# Patient Record
(demographics unavailable — no encounter records)

---

## 2017-08-18 NOTE — PSY
Date/Time of Note


Date/Time of Note


DATE: 8/18/17 


TIME: 05:17





Psychiatric Subjective Eval


Consent


Pt consented to telemedicine:  Yes





Subjective Evaluation


Patient location:  emergency


Chief Complaint:  suicidal attempt- lock himself in the bathromm cut his right 

thigh w/ razor


Reason for consult:  SI


Hospitalization:  no


Medical history


 Problems


Medical Problems:


(1) Visit for wound check


Status: Acute  





(2) Wound infection


Status: Acute  








Allergies:  


Coded Allergies:  


     No Known Allergy (Unverified , 9/13/14)





Social History


Marital status:  other





Assessment


Additional comments:


IDENTIFYING INFORMATION:  19 year old  Male patient who is currently 

located at the hospital and for whom psychiatric consultation was requested. 





SOURCES OF INFORMATION: The patient who appears to be reliable and the medical 

records; the nursing staff.





CHIEF COMPLAINT: "I just did not want to live anymore".





HISTORY OF PRESENT ILLNESS:


The patient was interviewed via telemedicine in the presence of and under the 

supervision of nursing staff of the hospital. The consent to conducting this 

interview via telemedicine was obtained by the nursing staff at the hospital. 





AIMEE Nation reports that the patient presents with anxiety, SI, and superficial 

laceration to his left thigh in the context of medication noncompliance.





The patient reports that he felt like he did not want to be alive anymore, and 

cut himself on his right hip with a razor to distract himself from the mental 

pain. 


Admits to thinking that he would be better off not being alive since Tuesday. 


Admits to having a depressed mood, anhedonia, fluctuating insomnia, fatigue, 

low motivation, low appetite.


Admits to having thought of suicide before but not today.


Denies having AH, VH, delusions.





The patient denies using alcohol heavily or regularly. 


The patient denies using any other substances.





In terms of past psychiatric history, the patient reports having a history of 

no past psychiatric hospitalizations. Has prior psychiatric contacts for 

depression. The patient reports having a history of no past suicide attempts. 

Has h/o cutting himself in the past.





PAST MEDICAL HISTORY: 


none.





CURRENT MEDICATIONS:


fluoxetine 40 mg po qday (started taking this again 5 days ago).





ALLERGIES TO MEDICATIONS: 


NKDA.





SOCIAL HISTORY: 


lives with parents, single, no children;


employed as a ; 


no firearms at home.





LABORATORY TESTS: 


pending.





REVIEW OF SYSTEMS:


Constitutional (e.g., fever, weight loss): negative;   


Eyes, Ears, Nose, Mouth, Throat: negative;


Cardiovascular: negative;


Respiratory: negative;


Gastrointestinal: negative;


Genitourinary: negative;


Musculoskeletal: negative;


Integumentary (skin and/or breast): negative; 


Neurological: negative;


Psychiatric: as per HPI;


Endocrine: negative;


Hematologic/Lymphatic: negative; 


Allergic/Immunologic: negative.





MENTAL STATUS EXAMINATION: 


General Appearance and Behavior: Calm, cooperative with the interview, pleasant 

with the current interviewer, makes poor eye contact, poorly groomed, no 

abnormal movements noted.


Speech: Slow rate, regular rhythm, increased latency, low volume, somewhat 

decreased in amount.


Flow of thought: sequential, logical, goal-directed.


Content of thought: no auditory hallucinations, no visual hallucinations, no 

delusions, 


positive for suicidal ideation; no homicidal ideation. 


Mood: "depressed".


Affect: dysthymic, dysphoric, not reactive.


Attention: normal based on the interview.


Insight: fair.


Judgment: poor.


Memory: normal based on the interview.


Sensorium: alert and oriented to person, place and date.





ASSESSMENT:


The patient's presentation and history are consistent with the diagnosis of 

major depressive disorder. 


The patient presents in a major depressive episode in the context of 

intermittent medication noncompliance.





Axis I: major depressive disorder.


Axis II: Deferred.


Axis III: see PMH.


Axis IV: social stressors.


Axis V: GAF: 10.





PLAN: 


- Medication management: 


Would continue prosac 40 mg po qday.





Would start haloperidol 5 mg IM PRN severe agitation q4 hours.


Would start diphenhydramine 50 mg IM PRN severe agitation q4 hours.


Would start lorazepam 2 mg IM PRN severe agitation q4 hours





Will defer to the inpatient psychiatry team for other medication changes. 





- Labs: please check CBC, CMP, UDS, alcohol level.





- Psychotherapy: Provided supportive psychotherapy and psychoeducation. 





- Disposition:


Would recommend involuntary admission to the inpatient psychiatric unit given 

the severity of the patient's psychiatric condition and the fact that the 

patient is an imminent danger to self and/or others so long as the patient has 

been cleared medically for admission to psychiatry. Inpatient psychiatric 

admission is at this time the least restrictive environment where the patient 

can receive the psychiatric care that is needed. Would place on suicide 

precautions.


The patient fulfills criteria for being placed on an involuntary hold for being 

a danger to self due to a psychiatric disorder.


 


Discussed about the above plan with Dr. Garces.











ROSI FOWLER MD Aug 18, 2017 05:17

## 2017-08-18 NOTE — ERA
ER Documentation


Chief Complaint


Date/Time


DATE: 8/18/17 


TIME: 04:31


Chief Complaint


suicidal attempt- lock himself in the bathromm cut his right thigh w/ razor





HPI


The patient is a 19-year-old male, presenting to the ER because of suicidal 

thoughts, he attempted to cut himself with a razor at the right thigh.  He 

denies auditory hallucination, visual hallucination, homicidal ideation.  He 

denies chest pain, abdominal pain, vomiting.  He does not smoke nor drink





Past medical history: Depression


Past Surgical history: None





ROS


All systems reviewed and are negative except as per history of present illness.





Allergies


Allergies:  


Coded Allergies:  


     No Known Allergy (Unverified , 9/13/14)





PMhx/Soc


Hx Alcohol Use:  No


Hx Substance Use:  No


Hx Tobacco Use:  No





Physical Exam


Vitals





Vital Signs








  Date Time  Temp Pulse Resp B/P Pulse Ox O2 Delivery O2 Flow Rate FiO2


 


8/18/17 03:57 97.5 84 20 119/68 99   








Physical Exam


 Const:      No acute distress.


 Head:        Atraumatic.


 Eyes:       Normal Conjunctiva.


 ENT:         Normal External Ears, Nose and Mouth.


 Neck:        Full range of motion.  No meningismus.


 Resp:         Clear to auscultation bilaterally.


 Cardio:       Regular rate and rhythm.


 Abd:         Soft,  non distended, normal bowel sounds, non tender.


 Skin:         No petechiae or rashes.


 Back:        No midline or flank tenderness.


 Ext:          No cyanosis, or edema.  Superficial abrasion at the right thigh


 Neur:        Awake and alert. No focal deficit


 Psych:        Suicidal and depressed


Result Diagram:  


8/18/17 0445                                                                   

             8/18/17 0445





Results 24 hrs








 Laboratory Tests








Test


  8/18/17


04:45


 


White Blood Count 8.710^3/ul 


 


Red Blood Count 5.4810^6/ul 


 


Hemoglobin 16.2g/dl 


 


Hematocrit 47.1% 


 


Mean Corpuscular Volume 85.9fl 


 


Mean Corpuscular Hemoglobin 29.6pg 


 


Mean Corpuscular Hemoglobin


Concent 34.4g/dl 


 


 


Red Cell Distribution Width 12.5% 


 


Platelet Count 34004^3/UL 


 


Mean Platelet Volume 10.9fl 


 


Neutrophils % 69.4% 


 


Lymphocytes % 21.0% 


 


Monocytes % 8.4% 


 


Eosinophils % 0.5% 


 


Basophils % 0.5% 


 


Nucleated Red Blood Cells % 0.0/100WBC 


 


Neutrophils # (Manual) 610^3/ul 


 


Lymphocytes # 1.810^3/ul 


 


Monocytes # 0.710^3/ul 


 


Eosinophils # 0.010^3/ul 


 


Basophils # 0.010^3/ul 


 


Nucleated Red Blood Cells # 0.010^3/ul 


 


Urine Color YELLOW 


 


Urine Clarity CLEAR 


 


Urine pH 6.0 


 


Urine Specific Gravity 1.018 


 


Urine Ketones 2+mg/dL 


 


Urine Nitrite NEGATIVEmg/dL 


 


Urine Bilirubin NEGATIVEmg/dL 


 


Urine Urobilinogen NEGATIVEmg/dL 


 


Urine Leukocyte Esterase TRACELeu/ul 


 


Urine Microscopic RBC 1/HPF 


 


Urine Microscopic WBC 14/HPF 


 


Urine Mucus FEW/HPF 


 


Urine Hemoglobin NEGATIVEmg/dL 


 


Urine Glucose NEGATIVEmg/dL 


 


Urine Total Protein NEGATIVEmg/dl 


 


Sodium Level 140mmol/L 


 


Potassium Level 3.7mmol/L 


 


Chloride Level 99mmol/L 


 


Carbon Dioxide Level 26mmol/L 


 


Anion Gap 19 


 


Blood Urea Nitrogen 8mg/dl 


 


Creatinine 0.81mg/dl 


 


Glucose Level 94mg/dl 


 


Calcium Level 10.0mg/dl 


 


Total Bilirubin 1.3mg/dl 


 


Direct Bilirubin 0.00mg/dl 


 


Indirect Bilirubin 1.3mg/dl 


 


Aspartate Amino Transf


(AST/SGOT) 26IU/L 


 


 


Alanine Aminotransferase


(ALT/SGPT) 33IU/L 


 


 


Alkaline Phosphatase 68IU/L 


 


Total Protein 9.3g/dl 


 


Albumin 5.2g/dl 


 


Globulin 4.10g/dl 


 


Albumin/Globulin Ratio 1.26 


 


Salicylates Level < 1.0mg/dl 


 


Urine Opiates Screen Negative 


 


Acetaminophen Level < 10.0ug/ml 


 


Urine Barbiturates Negative 


 


Urine Amphetamines Screen Negative 


 


Urine Benzodiazepines Screen Negative 


 


Urine Cocaine Screen Negative 


 


Urine Cannabinoids Negative 


 


Ethyl Alcohol Level < 10.0mg/dl 














Procedures/MDM


MEDICAL MAKING DECISION: The patient is a 19-year-old male, presenting with 

acute suicidal ideation, depression





The differential diagnoses considered include but are not limited to psychosis, 

drug-induced psychosis,decompensated psychiatric unit





Departure


Diagnosis:  


 Primary Impression:  


 Suicidal ideation


 Additional Impression:  


 Depression


Condition:  Stable


Comments


He was evaluated by telepsychiatrist who put him on a 5150 hold











SHAWANDA BRENNER MD Aug 18, 2017 04:32